# Patient Record
Sex: MALE
[De-identification: names, ages, dates, MRNs, and addresses within clinical notes are randomized per-mention and may not be internally consistent; named-entity substitution may affect disease eponyms.]

---

## 2021-05-14 PROBLEM — Z00.00 ENCOUNTER FOR PREVENTIVE HEALTH EXAMINATION: Status: ACTIVE | Noted: 2021-05-14

## 2021-08-06 ENCOUNTER — APPOINTMENT (OUTPATIENT)
Dept: PLASTIC SURGERY | Facility: CLINIC | Age: 51
End: 2021-08-06
Payer: COMMERCIAL

## 2021-08-06 VITALS — WEIGHT: 215 LBS | HEIGHT: 69 IN | BODY MASS INDEX: 31.84 KG/M2

## 2021-08-06 DIAGNOSIS — D48.5 NEOPLASM OF UNCERTAIN BEHAVIOR OF SKIN: ICD-10-CM

## 2021-08-06 DIAGNOSIS — N20.0 CALCULUS OF KIDNEY: ICD-10-CM

## 2021-08-06 DIAGNOSIS — Z78.9 OTHER SPECIFIED HEALTH STATUS: ICD-10-CM

## 2021-08-06 PROCEDURE — 99204 OFFICE O/P NEW MOD 45 MIN: CPT | Mod: 25

## 2021-08-06 PROCEDURE — 11103 TANGNTL BX SKIN EA SEP/ADDL: CPT

## 2021-08-06 PROCEDURE — 11102 TANGNTL BX SKIN SINGLE LES: CPT

## 2021-08-06 NOTE — PHYSICAL EXAM
[de-identified] : well-appearing, NAD [de-identified] : Left volar radial forearm and bilateral medial knees with 0.5 cm raised verrucous skin lesions, nontender, no erythema or drainage

## 2021-08-06 NOTE — HISTORY OF PRESENT ILLNESS
[FreeTextEntry1] : Pt is a 50 y/o M with no significant PMH who presents for evaluation of three skin lesions: left wrist and bilateral knees x 6 months. Denies significant change in size or shape nor bleeding or drainage. Right knee lesion previously had a scab that sloughed off. Tried Compound W with no significant improvement. Denies trauma. No previous biopsies.\par \par Denies personal or family h/o skin cancer.\par \par Nonsmoker, nondiabetic\par Denies h/o VTE or MRSA infections\par Occ: \par Lives with wife

## 2021-08-06 NOTE — PROCEDURE
[Nl] : None [FreeTextEntry1] : multiple verrucous skin lesions: left forearm, left medial knee, and right medial knee [FreeTextEntry2] : shave biopsy [FreeTextEntry6] : Patient was prepped and draped in the usual sterile fashion using betadine. 1 cc of 1% lidocaine with epinephrine was used to numb each region. Each lesion was removed via shave biopsy using a 15 blade scalpel. Hemostasis achieved using Monsel's solution and sterile bandages applied. The patient tolerated the procedure well and without any complications.\par  [FreeTextEntry7] : 1) left forearm and left knee shave biopsies 2) right knee shave biopsy

## 2021-08-06 NOTE — ASSESSMENT
[FreeTextEntry1] : 52 y/o M with three benign appearing skin lesions: left forearm and bilateral knees\par \par -Shave biopsy performed (see procedure note)\par -Postop instructions reviewed\par -To start Compound W once areas epithelialize\par -Will call with biopsy results\par -F/u PRN\par \par Due to COVID-19, pre-visit patient instructions were explained to the patient and their symptoms were checked upon arrival. Masks were used by the healthcare provider and staff and the examination room was cleaned after the patient visit concluded\par

## 2021-08-27 ENCOUNTER — NON-APPOINTMENT (OUTPATIENT)
Age: 51
End: 2021-08-27

## 2021-08-27 LAB — CORE LAB BIOPSY: NORMAL
